# Patient Record
Sex: MALE | Race: BLACK OR AFRICAN AMERICAN | URBAN - METROPOLITAN AREA
[De-identification: names, ages, dates, MRNs, and addresses within clinical notes are randomized per-mention and may not be internally consistent; named-entity substitution may affect disease eponyms.]

---

## 2023-09-23 ENCOUNTER — HOSPITAL ENCOUNTER (EMERGENCY)
Age: 33
Discharge: HOME | End: 2023-09-23

## 2023-09-23 VITALS
DIASTOLIC BLOOD PRESSURE: 81 MMHG | RESPIRATION RATE: 18 BRPM | TEMPERATURE: 98.3 F | SYSTOLIC BLOOD PRESSURE: 129 MMHG | HEART RATE: 82 BPM | OXYGEN SATURATION: 100 %

## 2023-09-23 DIAGNOSIS — R68.89 FLU-LIKE SYMPTOMS: Primary | ICD-10-CM

## 2023-09-23 LAB
FLUAV RNA UPPER RESP QL NAA+PROBE: NEGATIVE
FLUBV AG NPH QL: NEGATIVE
SARS-COV-2 RDRP RESP QL NAA+PROBE: NOT DETECTED

## 2023-09-23 PROCEDURE — 87635 SARS-COV-2 COVID-19 AMP PRB: CPT

## 2023-09-23 PROCEDURE — 87804 INFLUENZA ASSAY W/OPTIC: CPT

## 2023-09-23 PROCEDURE — 6370000000 HC RX 637 (ALT 250 FOR IP): Performed by: PHYSICIAN ASSISTANT

## 2023-09-23 PROCEDURE — 99284 EMERGENCY DEPT VISIT MOD MDM: CPT

## 2023-09-23 PROCEDURE — 96372 THER/PROPH/DIAG INJ SC/IM: CPT

## 2023-09-23 PROCEDURE — 6360000002 HC RX W HCPCS: Performed by: PHYSICIAN ASSISTANT

## 2023-09-23 RX ORDER — KETOROLAC TROMETHAMINE 30 MG/ML
30 INJECTION, SOLUTION INTRAMUSCULAR; INTRAVENOUS ONCE
Status: COMPLETED | OUTPATIENT
Start: 2023-09-23 | End: 2023-09-23

## 2023-09-23 RX ORDER — ACETAMINOPHEN 325 MG/1
650 TABLET ORAL ONCE
Status: COMPLETED | OUTPATIENT
Start: 2023-09-23 | End: 2023-09-23

## 2023-09-23 RX ADMIN — KETOROLAC TROMETHAMINE 30 MG: 30 INJECTION, SOLUTION INTRAMUSCULAR; INTRAVENOUS at 06:35

## 2023-09-23 RX ADMIN — ACETAMINOPHEN 650 MG: 325 TABLET ORAL at 06:34

## 2023-09-23 ASSESSMENT — PATIENT HEALTH QUESTIONNAIRE - PHQ9
SUM OF ALL RESPONSES TO PHQ QUESTIONS 1-9: 0
SUM OF ALL RESPONSES TO PHQ9 QUESTIONS 1 & 2: 0
SUM OF ALL RESPONSES TO PHQ QUESTIONS 1-9: 0
1. LITTLE INTEREST OR PLEASURE IN DOING THINGS: 0
2. FEELING DOWN, DEPRESSED OR HOPELESS: 0

## 2023-09-23 ASSESSMENT — PAIN DESCRIPTION - DESCRIPTORS
DESCRIPTORS: ACHING
DESCRIPTORS: ACHING

## 2023-09-23 ASSESSMENT — PAIN DESCRIPTION - PAIN TYPE: TYPE: ACUTE PAIN

## 2023-09-23 ASSESSMENT — PAIN SCALES - GENERAL
PAINLEVEL_OUTOF10: 8
PAINLEVEL_OUTOF10: 4

## 2023-09-23 ASSESSMENT — PAIN DESCRIPTION - ORIENTATION
ORIENTATION: ANTERIOR
ORIENTATION: ANTERIOR

## 2023-09-23 ASSESSMENT — PAIN DESCRIPTION - FREQUENCY: FREQUENCY: CONTINUOUS

## 2023-09-23 ASSESSMENT — PAIN DESCRIPTION - LOCATION
LOCATION: HEAD
LOCATION: HEAD

## 2023-09-23 ASSESSMENT — PAIN DESCRIPTION - ONSET: ONSET: ON-GOING

## 2023-09-23 NOTE — ED PROVIDER NOTES
[address] [facility] [address] [address]        Pt Name: Marline Navarro  MRN: [id number]  [address] [facility] 1990  Date of evaluation: 9/23/2023  Provider: Leandro Arnett PA-C  PCP: [facility] (Inactive)  Note Started: 6:23 AM EDT 9/23/23      VINICIO. I have evaluated this patient. CHIEF COMPLAINT       Chief Complaint   Patient presents with    Headache     Pt reports headache and nasal congestion \"for the last 2 days\". Pt states that symptoms haven't improved and rates pain as a 8/10 at this time. Pt states that ibuprofen \"improves pain temporarily\". Pt alert and oriented with no signs of distress noted. HISTORY OF PRESENT ILLNESS: 1 or more Elements     History From: patient  Limitations to history : None    Marline Navarro is a 33 y.o. male who presents to the emergency department by private vehicle complaining of body aches, chills, congestion, rhinorrhea, cough for the past 2 days. Patient denies any known sick contacts. Patient states headache were relieved temporarily ibuprofen, but he cannot get rid of headache. Patient denies thunderclap onset. He denies any fevers, neck stiffness/pain. Denies thunderclap onset or worst headache of life. Has had similar headaches previously. Given persistence of symptoms he sought evaluation in the ED. Nursing Notes were all reviewed and agreed with or any disagreements were addressed in the HPI. REVIEW OF SYSTEMS :      Review of Systems    Positives and Pertinent negatives as per HPI. SURGICAL HISTORY   History reviewed. No pertinent surgical history. CURRENTMEDICATIONS       Previous Medications    ACETAMINOPHEN (APAP EXTRA STRENGTH) 500 MG TABLET    Take 2 tablets by mouth 3 times daily as needed for Pain       ALLERGIES     Patient has no known allergies. FAMILYHISTORY     History reviewed. No pertinent family history.      SOCIAL HISTORY       Social History     Tobacco Use

## 2023-09-23 NOTE — DISCHARGE INSTRUCTIONS
COVID and influenza negative. Alternate between tylenol and ibuprofen for pain control. Suspect symptoms will improve in the next 48 hours. If you been having neck stiffness, vision changes, new or worsening headache or any other concerning symptoms return to the ED for reevaluation. Suspect you have a viral illness causing symptoms. Take 600 mg ibuprofen every 6 hours, take 650 mg of tylenol every 6 hours. Alternate between these. (If you can't find 325 mg tabs of tylenol, you can take 1000 mg every 8 hours. Don't take more than 3000 mg of tylenol in 1 day). [facility]) Referral number [phone] for [address]  5700 [facility]. [address] [facility]  Fax [phone]  Medical, OB/Gyn, Pediatrics, 1086 [facility]  Serves all of [facility] (Formerly [address] [place] [address])  [address]  [address] [place] [address]  920 [facility]. (Administrative offices)  [phone]  Homeless only [facility])  [address], [place], [address] [facility]  [phone] or [phone], 280 [facility],   Dental Appointments [phone] or [phone]  Pediatric, Family Practice, X-Ray  Serves all of [facility])  [address] [place] [address]. [facility]  [phone]   [facility] (FO.  Healthy)   1800 [facility]    (Located in [id number] or [phone], 2802 [facility],   Dental Appointments [phone] or [address] [place]  [address], [address]  [address]  [place]  4300 [facility]  3999 [facility].  [phone] Fax [address] and Surrounding areas [facility]. [phone] Fax [phone]  Medical, OB/Gyn, Pediatrics  Dental Clinic, [facility]  520 [facility]  [phone] Fax [phone]  [facility], Pediatrics, [place], 3501 [facility] 1033 [facility]  870 [facility].    [phone]  Urgent Care, Open 24 hrs, Urgent care, Gyn, Prenatal, Dental Mental, 3101 [facility]   [address] [place], [facility] [address] [phone]  (Located: 1800 [facility])  Pediatrics [phone], 2800 [facility],   Dental Appointments [phone] or [phone]  710 [facility]  [address]00 [place] [address], [place], Pediatrics, 3501 [facility] [address]  [facility]  [address]  [phone] Fax [phone]  Pediatrics, [facility]  1015 [facility]. [address] [address]  [phone]   Fax [phone]  Pediatrics, [address] [address] [facility]. [address]  [address] [facility] [address] [address]  [phone]  Pediatrics, Internal Med, [facility], Dental Clinic [address] [address] [address]   [phone] [facility]  800 [facility]  [phone] Fax [phone]  General Medical Clinic  Sliding scale fee  [facility] area     1301 [facility]  315 [facility]. [place], [address] [facility]  1601 [facility]  150 [facility] 4001 [facility], 809 [facility],4Th Floor  4370 [facility]   450 [facility]. [facility]  [address] Ocampo, [address],5Th Floor  [facility]  [phone]  Fax: